# Patient Record
Sex: FEMALE | Race: WHITE | NOT HISPANIC OR LATINO | Employment: FULL TIME | URBAN - METROPOLITAN AREA
[De-identification: names, ages, dates, MRNs, and addresses within clinical notes are randomized per-mention and may not be internally consistent; named-entity substitution may affect disease eponyms.]

---

## 2017-08-18 ENCOUNTER — GENERIC CONVERSION - ENCOUNTER (OUTPATIENT)
Dept: OTHER | Facility: OTHER | Age: 57
End: 2017-08-18

## 2017-08-25 ENCOUNTER — ALLSCRIPTS OFFICE VISIT (OUTPATIENT)
Dept: OTHER | Facility: OTHER | Age: 57
End: 2017-08-25

## 2017-08-25 DIAGNOSIS — Z12.31 ENCOUNTER FOR SCREENING MAMMOGRAM FOR MALIGNANT NEOPLASM OF BREAST: ICD-10-CM

## 2017-08-25 DIAGNOSIS — L85.3 XEROSIS CUTIS: ICD-10-CM

## 2017-08-25 DIAGNOSIS — R53.83 OTHER FATIGUE: ICD-10-CM

## 2017-08-25 DIAGNOSIS — Z13.220 ENCOUNTER FOR SCREENING FOR LIPOID DISORDERS: ICD-10-CM

## 2017-10-16 ENCOUNTER — GENERIC CONVERSION - ENCOUNTER (OUTPATIENT)
Dept: OTHER | Facility: OTHER | Age: 57
End: 2017-10-16

## 2017-11-17 ENCOUNTER — ALLSCRIPTS OFFICE VISIT (OUTPATIENT)
Dept: OTHER | Facility: OTHER | Age: 57
End: 2017-11-17

## 2017-11-18 NOTE — PROGRESS NOTES
Assessment    1  Acute URI (465 9) (J06 9)   2  Costochondritis, acute (733 6) (M94 0)    Plan  Costochondritis, acute    · Meloxicam 15 MG Oral Tablet; TAKE 1 TABLET DAILY WITH FOOD   · Follow-up PRN Evaluation and Treatment  Follow-up  Status: Complete  Done:17Nov2017    Discussion/Summary    Make sure you are taking plenty of liquids, tea with honey  Will treat the rib pain with meloxicam 15 mg at least every day with food for the next week  Use her Phenergan with codeine at night if you still have cough for pain  If you still have this pain in a week call here and I will get a rib series to rule out anything else  The patient was counseled regarding instructions for management,-- risk factor reductions,-- prognosis,-- patient and family education,-- impressions,-- risks and benefits of treatment options,-- importance of compliance with treatment  Possible side effects of new medications were reviewed with the patient/guardian today  The treatment plan was reviewed with the patient/guardian  The patient/guardian understands and agrees with the treatment plan      Chief Complaint  patient is here for a sick visit, she has a chest cold she is starting to feel better, she still has a cough and has pain underneath R breast area  urgent care gave her Afrin and promethazine w/codeine and Tessalon perles      History of Present Illness  HPI: Pt is here for uri for about 1 month  Feels better but has a pain in the right lower ribs  Still coughing, but very dry  Was using mucinex  , nasal spray and promethazine with codeine for several weeks  Still able to produce mucous if she uses nasal spray  Pt has pain in the right lower anterior ribs  Tender to touch also  no fevers  Never smoked  Only on otc vitamins now  Went to urgent care for cough meds  Review of Systems   Constitutional: no fever-- and-- no chills  ENT: no earache,-- no sore throat-- and-- no nasal discharge    Cardiovascular: + rib pain, but-- no chest pain-- and-- no palpitations  Respiratory: cough, but-- no shortness of breath  Musculoskeletal: arthralgias, but-- no myalgias  Neurological: no headache-- and-- no dizziness  ROS reviewed  Active Problems    1  Dry skin (701 1) (L85 3)   2  Encounter for screening colonoscopy (V76 51) (Z12 11)   3  Encounter for screening mammogram for malignant neoplasm of breast (V76 12) (Z12 31)   4  Fatigue (780 79) (R53 83)   5  Screening, lipid (V77 91) (X00 582)    Past Medical History  Active Problems And Past Medical History Reviewed: The active problems and past medical history were reviewed and updated today  Family History  Mother    1  Family history of essential hypertension (V17 49) (Z82 49)   2  Family history of thyroid disease (V18 19) (Z83 49)  Father    3  Family history of cardiac disorder (V17 49) (Z82 49)   4  Family history of cerebrovascular accident (CVA) (V17 1) (Z82 3)   5  Family history of essential hypertension (V17 49) (Z82 49)   6  Family history of lung disease (V19 8) (Z83 6)   7  Family history of thyroid disease (V18 19) (Z83 49)  Maternal Grandmother    8  Family history of malignant neoplasm of stomach (V16 0) (Z80 0)  Paternal Grandfather    5  Family history of colon cancer (V16 0) (Z80 0)  Family History Reviewed: The family history was reviewed and updated today  Social History     · Always uses seat belt   · Consumes alcohol at social events (V49 89) (Z78 9)   · Daily caffeine consumption   · Does not use illicit drugs (U86 00) (Z78 9)   · Full-time employment   · Never smoker  The social history was reviewed and updated today  Surgical History  Surgical History Reviewed: The surgical history was reviewed and updated today  Current Meds   1  Multi Vitamin TABS; Therapy: (Nara Shivers) to Recorded    The medication list was reviewed and updated today  Allergies  1   No Known Drug Allergies    Vitals   Recorded: [id number] 09:44AM Temperature 96 2 F   Heart Rate 52   Respiration 12   Systolic 642   Diastolic 80   Height 5 ft 9 in   Weight 151 lb    BMI Calculated 22 3   BSA Calculated 1 83   O2 Saturation 100       Physical Exam   Constitutional  General appearance: No acute distress, well appearing and well nourished  Eyes  Conjunctiva and lids: No swelling, erythema or discharge  Pupils and irises: Equal, round and reactive to light  Ears, Nose, Mouth, and Throat  External inspection of ears and nose: Normal    Otoscopic examination: Tympanic membranes translucent with normal light reflex  Canals patent without erythema  Nasal mucosa, septum, and turbinates: Normal without edema or erythema  Oropharynx: Normal with no erythema, edema, exudate or lesions  Pulmonary  Respiratory effort: No increased work of breathing or signs of respiratory distress  Auscultation of lungs: Clear to auscultation  Cardiovascular  Auscultation of heart: Normal rate and rhythm, normal S1 and S2, without murmurs  Lymphatic  Palpation of lymph nodes in neck: No lymphadenopathy  Musculoskeletal  Gait and station: Normal    Inspection/palpation of joints, bones, and muscles: Normal    Neurologic  Cranial nerves: Cranial nerves 2-12 intact  Sensation: No sensory loss  Psychiatric  Orientation to person, place, and time: Normal    Mood and affect: Normal          Attending Note  Collaborating Physician Note: Collaborating Note: I supervised the Advanced Practitioner-- and-- I agree with the Advanced Practitioner note  Signatures   Electronically signed by :  CAIT Hunter; Nov 17 2017 10:14AM EST                       (Author)    Electronically signed by : Kev Conrad DO; Nov 17 2017 11:19AM EST                       (Co-author)

## 2017-12-08 ENCOUNTER — GENERIC CONVERSION - ENCOUNTER (OUTPATIENT)
Dept: FAMILY MEDICINE CLINIC | Facility: CLINIC | Age: 57
End: 2017-12-08

## 2018-01-13 VITALS
DIASTOLIC BLOOD PRESSURE: 80 MMHG | OXYGEN SATURATION: 98 % | HEIGHT: 69 IN | WEIGHT: 153.38 LBS | TEMPERATURE: 97 F | SYSTOLIC BLOOD PRESSURE: 110 MMHG | RESPIRATION RATE: 12 BRPM | HEART RATE: 50 BPM | BODY MASS INDEX: 22.72 KG/M2

## 2018-01-13 VITALS
DIASTOLIC BLOOD PRESSURE: 80 MMHG | TEMPERATURE: 96.2 F | RESPIRATION RATE: 12 BRPM | SYSTOLIC BLOOD PRESSURE: 120 MMHG | HEIGHT: 69 IN | WEIGHT: 151 LBS | HEART RATE: 52 BPM | BODY MASS INDEX: 22.36 KG/M2 | OXYGEN SATURATION: 100 %

## 2019-11-18 ENCOUNTER — OFFICE VISIT (OUTPATIENT)
Dept: FAMILY MEDICINE CLINIC | Facility: CLINIC | Age: 59
End: 2019-11-18
Payer: COMMERCIAL

## 2019-11-18 VITALS
SYSTOLIC BLOOD PRESSURE: 108 MMHG | HEART RATE: 58 BPM | RESPIRATION RATE: 14 BRPM | TEMPERATURE: 97.9 F | DIASTOLIC BLOOD PRESSURE: 64 MMHG | OXYGEN SATURATION: 97 % | BODY MASS INDEX: 23.98 KG/M2 | HEIGHT: 68 IN | WEIGHT: 158.2 LBS

## 2019-11-18 DIAGNOSIS — Z13.220 SCREENING, LIPID: ICD-10-CM

## 2019-11-18 DIAGNOSIS — Z78.0 POSTMENOPAUSAL: ICD-10-CM

## 2019-11-18 DIAGNOSIS — Z11.4 SCREENING FOR HIV (HUMAN IMMUNODEFICIENCY VIRUS): ICD-10-CM

## 2019-11-18 DIAGNOSIS — M54.50 CHRONIC LOW BACK PAIN WITHOUT SCIATICA, UNSPECIFIED BACK PAIN LATERALITY: ICD-10-CM

## 2019-11-18 DIAGNOSIS — Z12.11 SCREENING FOR MALIGNANT NEOPLASM OF COLON: ICD-10-CM

## 2019-11-18 DIAGNOSIS — Z11.59 ENCOUNTER FOR HEPATITIS C SCREENING TEST FOR LOW RISK PATIENT: Primary | ICD-10-CM

## 2019-11-18 DIAGNOSIS — G89.29 CHRONIC LOW BACK PAIN WITHOUT SCIATICA, UNSPECIFIED BACK PAIN LATERALITY: ICD-10-CM

## 2019-11-18 LAB
CREAT ?TM UR-SCNC: 26.2 UMOL/L
EXTERNAL HIV SCREEN: NORMAL
HCV AB SER-ACNC: NEGATIVE
MICROALBUMIN/CREAT UR: NORMAL MG/G{CREAT}

## 2019-11-18 PROCEDURE — 1036F TOBACCO NON-USER: CPT | Performed by: FAMILY MEDICINE

## 2019-11-18 PROCEDURE — 99214 OFFICE O/P EST MOD 30 MIN: CPT | Performed by: FAMILY MEDICINE

## 2019-11-18 RX ORDER — MELATONIN
2000 DAILY
Qty: 60 TABLET | Refills: 3
Start: 2019-11-18

## 2019-11-18 NOTE — PATIENT INSTRUCTIONS
Discussed all with patient  You're doing excellent  Have the blood work done fasting but drink water before the test we will call with all results  Have your mammogram done as planned  Do the colo guard this year I will call you with the results if the color guard is negative you will not need a colonoscopy for 3 years  Okay to start vitamin D3 2000 units if you're not already on it and try to get at least 1200 mg of calcium from your diet for bone health  At 70 you will be eligible for a DEXA scan however if you would like to know before 65 you may want to do a lifeline screening

## 2019-11-18 NOTE — PROGRESS NOTES
Assessment/Plan:     Chronic Problems:  No problem-specific Assessment & Plan notes found for this encounter  Visit Diagnosis:  Diagnoses and all orders for this visit:    Encounter for hepatitis C screening test for low risk patient  -     Hepatitis C antibody; Future    Chronic low back pain without sciatica, unspecified back pain laterality  Comments:  Ok to use advil or aleve  Screening for HIV (human immunodeficiency virus)  -     HIV 1/2 AG-AB combo; Future    Screening, lipid  -     Comprehensive metabolic panel; Future  -     Lipid panel; Future  -     Microalbumin / creatinine urine ratio  -     Urinalysis with microscopic    Screening for malignant neoplasm of colon  -     Cologuard; Future    Postmenopausal  -     cholecalciferol (VITAMIN D3) 1,000 units tablet; Take 2 tablets (2,000 Units total) by mouth daily          Subjective:    Patient ID: Paola Horne is a 62 y o  female  Pt is here for routine f/u appt  Pt has no concerns today  Does not want a flu shot  Not currently on any meds  Feeling well with no issues other than some low back stiffness  The following portions of the patient's history were reviewed and updated as appropriate: allergies, current medications, past family history, past medical history, past social history, past surgical history and problem list     Review of Systems   Constitutional: Negative for chills, diaphoresis, fatigue and fever  HENT: Negative  Eyes: Negative  Respiratory: Negative for cough, shortness of breath and wheezing  Cardiovascular: Negative for chest pain and palpitations  Gastrointestinal: Negative  Last colonoscopy - about 3 years ago  Paternal gf with colon cancer  Dad is still alive at 80  Genitourinary: Negative  Last pap - about 2 years ago and reported as wnl  Musculoskeletal: Positive for back pain (at times  )  Non cancerous lesion removed from her back years ago   Due for mammo in January  Skin: Negative for rash and wound  Neurological: Negative for dizziness, light-headedness and headaches  Psychiatric/Behavioral: Negative for dysphoric mood and sleep disturbance  The patient is not nervous/anxious  /64   Pulse 58   Temp 97 9 °F (36 6 °C) (Tympanic)   Resp 14   Ht 5' 8 25" (1 734 m)   Wt 71 8 kg (158 lb 3 2 oz)   SpO2 97%   BMI 23 88 kg/m²   Social History     Socioeconomic History    Marital status: /Civil Union     Spouse name: Not on file    Number of children: Not on file    Years of education: Not on file    Highest education level: Not on file   Occupational History    Occupation: Full-time employment   Social Needs    Financial resource strain: Not on file    Food insecurity:     Worry: Not on file     Inability: Not on file    Transportation needs:     Medical: Not on file     Non-medical: Not on file   Tobacco Use    Smoking status: Never Smoker    Smokeless tobacco: Never Used   Substance and Sexual Activity    Alcohol use: Yes     Comment: Consumes alcohol at social events    Drug use: No    Sexual activity: Not on file   Lifestyle    Physical activity:     Days per week: Not on file     Minutes per session: Not on file    Stress: Not on file   Relationships    Social connections:     Talks on phone: Not on file     Gets together: Not on file     Attends [employer] service: Not on file     Active member of club or organization: Not on file     Attends meetings of clubs or organizations: Not on file     Relationship status: Not on file    Intimate partner violence:     Fear of current or ex partner: Not on file     Emotionally abused: Not on file     Physically abused: Not on file     Forced sexual activity: Not on file   Other Topics Concern    Not on file   Social History Narrative    Always uses seat belt    Daily caffeine consumption     History reviewed  No pertinent past medical history    Family History   Problem Relation Age of Onset    Hypertension Mother         Essential Hypertension    Thyroid disease Mother     Atrial fibrillation Mother     Stroke Mother     Heart disease Father         Cardiac disorder    Stroke Father         Cerebrovascular Accident (CVA)    Hypertension Father         Essential Hypertension    Lung disease Father     Thyroid disease Father     Stomach cancer Maternal Grandmother     Colon cancer Paternal Grandfather      History reviewed  No pertinent surgical history  Current Outpatient Medications:     cholecalciferol (VITAMIN D3) 1,000 units tablet, Take 2 tablets (2,000 Units total) by mouth daily, Disp: 60 tablet, Rfl: 3    No Known Allergies       Lab Review   No visits with results within 2 Month(s) from this visit  Latest known visit with results is:   No results found for any previous visit  Imaging: No results found  Objective:     Physical Exam   Constitutional: She is oriented to person, place, and time  She appears well-developed and well-nourished  No distress  HENT:   Head: Normocephalic and atraumatic  Right Ear: External ear normal    Left Ear: External ear normal    Mouth/Throat: Oropharynx is clear and moist    Eyes: Pupils are equal, round, and reactive to light  Conjunctivae and EOM are normal  Right eye exhibits no discharge  Left eye exhibits no discharge  Neck: Normal range of motion  Neck supple  No thyromegaly present  Cardiovascular: Normal rate, regular rhythm and normal heart sounds  Exam reveals no friction rub  No murmur heard  Pulmonary/Chest: Effort normal and breath sounds normal  No respiratory distress  She has no wheezes  She has no rales  She exhibits no tenderness  Abdominal: Soft  Bowel sounds are normal  There is no tenderness  Musculoskeletal: Normal range of motion  She exhibits no edema, tenderness or deformity  Lymphadenopathy:     She has no cervical adenopathy     Neurological: She is alert and oriented to person, place, and time  No cranial nerve deficit  Skin: Skin is warm and dry  No rash noted  She is not diaphoretic  Psychiatric: She has a normal mood and affect  Her behavior is normal  Judgment and thought content normal          Patient Instructions   Discussed all with patient  You're doing excellent  Have the blood work done fasting but drink water before the test we will call with all results  Have your mammogram done as planned  Do the colo guard this year I will call you with the results if the color guard is negative you will not need a colonoscopy for 3 years  Okay to start vitamin D3 2000 units if you're not already on it and try to get at least 1200 mg of calcium from your diet for bone health  At 70 you will be eligible for a DEXA scan however if you would like to know before 65 you may want to do a lifeline screening  CAIT Leija    Portions of the record may have been created with voice recognition software  Occasional wrong word or "sound a like" substitutions may have occurred due to the inherent limitations of voice recognition software  Read the chart carefully and recognize, using context, where substitutions have occurred

## 2019-11-19 DIAGNOSIS — R31.9 HEMATURIA, UNSPECIFIED TYPE: Primary | ICD-10-CM

## 2019-12-02 ENCOUNTER — TELEPHONE (OUTPATIENT)
Dept: FAMILY MEDICINE CLINIC | Facility: CLINIC | Age: 59
End: 2019-12-02

## 2019-12-02 NOTE — TELEPHONE ENCOUNTER
----- Message from [address] [place] [address]  sent at 12/1/2019  8:08 PM EST -----  Cologuard is negative  Good for 3 years

## 2020-08-31 DIAGNOSIS — Z13.220 SCREENING, LIPID: Primary | ICD-10-CM

## 2020-10-19 DIAGNOSIS — Z83.49 FAMILY HISTORY OF THYROID DISEASE: Primary | ICD-10-CM

## 2020-12-08 ENCOUNTER — TELEPHONE (OUTPATIENT)
Dept: FAMILY MEDICINE CLINIC | Facility: CLINIC | Age: 60
End: 2020-12-08

## 2020-12-11 ENCOUNTER — TELEMEDICINE (OUTPATIENT)
Dept: FAMILY MEDICINE CLINIC | Facility: CLINIC | Age: 60
End: 2020-12-11
Payer: COMMERCIAL

## 2020-12-11 DIAGNOSIS — U07.1 COVID-19 VIRUS INFECTION: Primary | ICD-10-CM

## 2020-12-11 PROCEDURE — 1036F TOBACCO NON-USER: CPT | Performed by: FAMILY MEDICINE

## 2020-12-11 PROCEDURE — 99213 OFFICE O/P EST LOW 20 MIN: CPT | Performed by: FAMILY MEDICINE

## 2021-07-21 ENCOUNTER — TELEPHONE (OUTPATIENT)
Dept: FAMILY MEDICINE CLINIC | Facility: CLINIC | Age: 61
End: 2021-07-21

## 2021-07-21 NOTE — TELEPHONE ENCOUNTER
----- Message from [address] [place] [address]  sent at 7/20/2021  2:27 PM EDT -----  Could you please mail out the slip for covid antibody testing to [facility] please?  ty

## 2021-07-28 ENCOUNTER — VBI (OUTPATIENT)
Dept: ADMINISTRATIVE | Facility: OTHER | Age: 61
End: 2021-07-28

## 2021-07-30 ENCOUNTER — TELEPHONE (OUTPATIENT)
Dept: FAMILY MEDICINE CLINIC | Facility: CLINIC | Age: 61
End: 2021-07-30

## 2021-07-30 NOTE — TELEPHONE ENCOUNTER
----- Message from [address] [place] [address]  sent at 7/30/2021  8:38 AM EDT -----  Please tell Petra Simmons that she has no antibodies to covid  She absolutely needs the shot and is at risk   She can sign up with us or her local pharmacy;

## 2021-08-23 ENCOUNTER — VBI (OUTPATIENT)
Dept: ADMINISTRATIVE | Facility: OTHER | Age: 61
End: 2021-08-23

## 2021-12-15 ENCOUNTER — VBI (OUTPATIENT)
Dept: ADMINISTRATIVE | Facility: OTHER | Age: 61
End: 2021-12-15